# Patient Record
Sex: MALE | URBAN - METROPOLITAN AREA
[De-identification: names, ages, dates, MRNs, and addresses within clinical notes are randomized per-mention and may not be internally consistent; named-entity substitution may affect disease eponyms.]

---

## 2021-07-28 ENCOUNTER — APPOINTMENT (RX ONLY)
Dept: URBAN - METROPOLITAN AREA CLINIC 308 | Facility: CLINIC | Age: 66
Setting detail: DERMATOLOGY
End: 2021-07-28

## 2021-07-28 DIAGNOSIS — D18.0 HEMANGIOMA: ICD-10-CM

## 2021-07-28 DIAGNOSIS — L57.0 ACTINIC KERATOSIS: ICD-10-CM

## 2021-07-28 DIAGNOSIS — D485 NEOPLASM OF UNCERTAIN BEHAVIOR OF SKIN: ICD-10-CM

## 2021-07-28 DIAGNOSIS — L20.89 OTHER ATOPIC DERMATITIS: ICD-10-CM | Status: INADEQUATELY CONTROLLED

## 2021-07-28 DIAGNOSIS — L82.1 OTHER SEBORRHEIC KERATOSIS: ICD-10-CM

## 2021-07-28 DIAGNOSIS — L81.4 OTHER MELANIN HYPERPIGMENTATION: ICD-10-CM

## 2021-07-28 DIAGNOSIS — D22 MELANOCYTIC NEVI: ICD-10-CM

## 2021-07-28 PROBLEM — D18.01 HEMANGIOMA OF SKIN AND SUBCUTANEOUS TISSUE: Status: ACTIVE | Noted: 2021-07-28

## 2021-07-28 PROBLEM — L20.84 INTRINSIC (ALLERGIC) ECZEMA: Status: ACTIVE | Noted: 2021-07-28

## 2021-07-28 PROBLEM — D48.5 NEOPLASM OF UNCERTAIN BEHAVIOR OF SKIN: Status: ACTIVE | Noted: 2021-07-28

## 2021-07-28 PROBLEM — D22.5 MELANOCYTIC NEVI OF TRUNK: Status: ACTIVE | Noted: 2021-07-28

## 2021-07-28 PROCEDURE — ? BIOPSY BY SHAVE METHOD

## 2021-07-28 PROCEDURE — ? TREATMENT REGIMEN

## 2021-07-28 PROCEDURE — ? FULL BODY SKIN EXAM

## 2021-07-28 PROCEDURE — ? PRESCRIPTION MEDICATION MANAGEMENT

## 2021-07-28 PROCEDURE — ? LIQUID NITROGEN

## 2021-07-28 PROCEDURE — 11102 TANGNTL BX SKIN SINGLE LES: CPT

## 2021-07-28 PROCEDURE — ? COUNSELING

## 2021-07-28 PROCEDURE — ? ORDER FOR PATCH TESTING

## 2021-07-28 PROCEDURE — ? ADDITIONAL NOTES

## 2021-07-28 PROCEDURE — 17000 DESTRUCT PREMALG LESION: CPT | Mod: 59

## 2021-07-28 PROCEDURE — 99214 OFFICE O/P EST MOD 30 MIN: CPT | Mod: 25

## 2021-07-28 ASSESSMENT — LOCATION ZONE DERM
LOCATION ZONE: ARM
LOCATION ZONE: TRUNK
LOCATION ZONE: HAND
LOCATION ZONE: FACE

## 2021-07-28 ASSESSMENT — LOCATION DETAILED DESCRIPTION DERM
LOCATION DETAILED: RIGHT RADIAL DORSAL HAND
LOCATION DETAILED: RIGHT SUPERIOR MEDIAL UPPER BACK
LOCATION DETAILED: RIGHT MID-UPPER BACK
LOCATION DETAILED: RIGHT MEDIAL TEMPLE
LOCATION DETAILED: LEFT MID-UPPER BACK
LOCATION DETAILED: RIGHT VENTRAL PROXIMAL FOREARM
LOCATION DETAILED: EPIGASTRIC SKIN

## 2021-07-28 ASSESSMENT — LOCATION SIMPLE DESCRIPTION DERM
LOCATION SIMPLE: ABDOMEN
LOCATION SIMPLE: LEFT UPPER BACK
LOCATION SIMPLE: RIGHT UPPER BACK
LOCATION SIMPLE: RIGHT FOREARM
LOCATION SIMPLE: RIGHT TEMPLE
LOCATION SIMPLE: RIGHT HAND

## 2021-07-28 NOTE — PROCEDURE: BIOPSY BY SHAVE METHOD

## 2021-07-28 NOTE — PROCEDURE: ORDER FOR PATCH TESTING
Patch Test To Be Applied: [employer] 80 Series
Location Patches Should Be Applied: Back
Patch Test Reading Schedule: First Reading at 48 hours and Second Reading at 96 hours
Counseling: I discussed the timing of the procedure and ensured the patient understands that this test requires multiple visits. No topical steroids applied should be applied to the patch testing location and no oral prednisone for two week prior to the test. While the patches are in place they should be kept dry which will limit bathing, swimming an exercise. I also explained that it is common for testing to be negative and this doesn't mean there isn't a allergic reaction occurring. During the testing itching is common.
Detail Level: Simple

## 2021-07-28 NOTE — PROCEDURE: COUNSELING
Detail Level: Simple
Detail Level: Zone
Cleanser Recommendations: Cetaphil gentle cleanser is great for the face, vanicream makes great hypoallergenic products, and dove white fragrance free beauty bar is great for the body.
Bleach Bath Recommendations: 1/4 cup of bleach in entire bathtub of water- soak 15 min, then rinse with warm water and repeat 2x a week
Detail Level: Detailed
Moisturizer Recommendations: Cerave or Cetaphil cream

## 2021-07-28 NOTE — PROCEDURE: LIQUID NITROGEN
Render Note In Bullet Format When Appropriate: No
Detail Level: Detailed
Consent: The patient's consent was obtained including but not limited to risks of crusting, scabbing, blistering, scarring, darker or lighter pigmentary change, recurrence, incomplete removal and infection.
Show Aperture Variable?: Yes
Post-Care Instructions: I reviewed with the patient in detail post-care instructions. Patient is to wear sunprotection, and avoid picking at any of the treated lesions. Pt may apply Vaseline to crusted or scabbing areas.
Duration Of Freeze Thaw-Cycle (Seconds): 0

## 2021-09-07 ENCOUNTER — APPOINTMENT (RX ONLY)
Dept: URBAN - METROPOLITAN AREA CLINIC 308 | Facility: CLINIC | Age: 66
Setting detail: DERMATOLOGY
End: 2021-09-07

## 2021-09-07 DIAGNOSIS — L23.9 ALLERGIC CONTACT DERMATITIS, UNSPECIFIED CAUSE: ICD-10-CM

## 2021-09-07 PROCEDURE — ? PATCH TESTING

## 2021-09-07 PROCEDURE — ? DIAGNOSIS COMMENT

## 2021-09-07 PROCEDURE — ? COUNSELING

## 2021-09-07 PROCEDURE — 95044 PATCH/APPLICATION TESTS: CPT

## 2021-09-07 PROCEDURE — ? PATIENT SPECIFIC COUNSELING

## 2021-09-07 ASSESSMENT — LOCATION SIMPLE DESCRIPTION DERM
LOCATION SIMPLE: LEFT UPPER BACK
LOCATION SIMPLE: RIGHT UPPER BACK

## 2021-09-07 ASSESSMENT — LOCATION DETAILED DESCRIPTION DERM
LOCATION DETAILED: LEFT SUPERIOR LATERAL UPPER BACK
LOCATION DETAILED: LEFT SUPERIOR UPPER BACK
LOCATION DETAILED: RIGHT SUPERIOR UPPER BACK

## 2021-09-07 ASSESSMENT — LOCATION ZONE DERM: LOCATION ZONE: TRUNK

## 2021-09-07 NOTE — PROCEDURE: PATIENT SPECIFIC COUNSELING
You will need to return to the office for two additional visits to be able to read the results appropriately: \\n- 48 hours after patches applied (1st read of results)\\n- 4-7 days after patches applied (2nd read of results)\\n\\nPlease be aware of the following for best results:\\n- You CANNOT get your back wet until you have completed the second read of the results. \\n- Avoid activities where you are twisting, which may loosen the patches.\\n- Avoid activities that induce sweating, as the sweat may displace the substances on the patches, rendering results misleading/indeterminate. \\n- If the tape begins to come off, please reinforce with micropore tape (this can be found at most pharmacies)
Detail Level: Detailed

## 2021-09-07 NOTE — PROCEDURE: PATCH TESTING
Post-Care Instructions: I reviewed with the patient in detail post-care instructions. Patient should not sweat, pick at, or get the patches wet for 48 hours.
Consent: Written consent obtained, risks reviewed including but not limited to rash, itching, allergic reaction, systemic rash, remote possiblity of anaphylaxis to allergen.
Number Of Patches (Maximum Allowable Per Dos By Cms Is 90): 80
Detail Level: Detailed

## 2021-09-09 ENCOUNTER — APPOINTMENT (RX ONLY)
Dept: URBAN - METROPOLITAN AREA CLINIC 373 | Facility: CLINIC | Age: 66
Setting detail: DERMATOLOGY
End: 2021-09-09

## 2021-09-09 DIAGNOSIS — L259 CONTACT DERMATITIS AND OTHER ECZEMA, UNSPECIFIED CAUSE: ICD-10-CM

## 2021-09-09 PROBLEM — L23.9 ALLERGIC CONTACT DERMATITIS, UNSPECIFIED CAUSE: Status: ACTIVE | Noted: 2021-09-09

## 2021-09-09 PROCEDURE — 99212 OFFICE O/P EST SF 10 MIN: CPT

## 2021-09-09 PROCEDURE — ? NORTH AMERICAN 80 PATCH TEST READING

## 2021-09-09 NOTE — PROCEDURE: [employer] 80 PATCH TEST READING
Name Of Allergen 24: benzocaine
Allergen 30 Reaction: no reaction
Name Of Allergen 3: fragrance mix
Name Of Allergen 15: black rubber mix - PPD
Name Of Allergen 57: ethyleneurea, melamine formaldehyde mix
Name Of Allergen 18: tixocortol-21-pivalate
Name Of Allergen 21: bisphenol A epoxy resin
Name Of Allergen 66: phenoxyethanol
Name Of Allergen 12: thiuram mix
Name Of Allergen 60: cetylstearylalcohol
Name Of Allergen 69: butylhydroxytoluene (BHT)
Name Of Allergen 27: cinchocaine-HCl
Name Of Allergen 38: 2-hydroxyethyl methacrylate (HEMA)
Name Of Allergen 63: clobetasol-17-propionate
Name Of Allergen 79: amidoamine (stearamidopropyl dimethylamine)
Name Of Allergen 41: cinnamic aldehyde
Name Of Allergen 44: 4-chloro-3,5-xylenol (PCMX)
Name Of Allergen 29: 2-bromo-2-nitropropane-1,3-diol (bronopol™)
Name Of Allergen 73: methylchloroisothiazolinone/methylisothiazolinone
Name Of Allergen 32: methyldibromo glutaronitrile (MDBGN)
Name Of Allergen 56: compositae mix
Name Of Allergen 47: tosylamide formaldehyde resin
Name Of Allergen 76: dimethylaminopropylamine (DMAPA)
What Reading Time Point?: 24 hour
Name Of Allergen 22: ethylenediamine dihydrochloride
Name Of Allergen 33: disperse blue mix
Name Of Allergen 67: disperse orange-3
Name Of Allergen 48: sesquiterpenelactone mix
Name Of Allergen 70: 2-ethylhexyl-4-methoxycinnamate (octinoxate)
Name Of Allergen 61: glutaraldehyde
Name Of Allergen 39: decyl glucoside
Name Of Allergen 80: chlorhexidine digluconate
Name Of Allergen 71: benzyl alcohol
Number Of Patches Read: 80
Name Of Allergen 54: sorbic acid
Name Of Allergen 42: ethyl acrylate
Name Of Allergen 30: lidocaine-HCl
Name Of Allergen 74: methylisothiazolinone
Name Of Allergen 45: propolis
Allergen 2 Reaction: 1+
Name Of Allergen 36: iodopropynyl butylcarbamate
Name Of Allergen 6: budesonide
Name Of Allergen 77: oleamidopropyl dimethylamine
Name Of Allergen 51: benzalkonium chloride
Name Of Allergen 9: 4-phenylenediamine base
Name Of Allergen 62: triamcinolone acetonide
Name Of Allergen 72: formaldehyde
Name Of Allergen 28: partcelenaolide
Show Allergen Counseling In The Note?: Yes
Name Of Allergen 75: cocamidopropyl betaine
Name Of Allergen 55: cananga odorata (niru marrufo)
Name Of Allergen 46: 2-hydroxy-4-methoxybenzophenone (oxybenzone)
Name Of Allergen 43: tea tree oil, oxidized
Name Of Allergen 68: jasminum officinale oil (jasminum grandiflorum)
Name Of Allergen 49: coconut diethanolamide (cocamide LOUIS)
Name Of Allergen 16: imidazolidinyl urea (Germall® 115)
Name Of Allergen 78: propylene glycol
Name Of Allergen 52: benzophenone-4
Detail Level: Zone
Name Of Allergen 40: methyl methacrylate
Name Of Allergen 10: potassium dichromate
Name Of Allergen 13: diazolidinyl urea (Germall® II)
Name Of Allergen 1: nickel sulfate hexahydrate
Name Of Allergen 25: bacitracin
Name Of Allergen 4: quaternium 15
Name Of Allergen 7: cobalt (II) chloride hexahydrate
Name Of Allergen 58: sorbitan sesquioleate
Name Of Allergen 19: 2-mercaptobenzothiazole
Name Of Allergen 64: dl alpha tocopherol
Name Of Allergen 35: fragrance mix II
Name Of Allergen 17: mercapto mix
Name Of Allergen 50: 4-chloro-3-cresol ([facility])
Name Of Allergen 8: c-[id number] formaldehyde resin
Name Of Allergen 53: triclosan
Name Of Allergen 20: colophony
Name Of Allergen 11: carba mix
Name Of Allergen 23: amerchol L101
Name Of Allergen 14: paraben mix
Name Of Allergen 2: balsam of peru (myroxylon pereirae resin)
Name Of Allergen 5: neomycin sulfate
Name Of Allergen 26: DMDM hydantoin
Name Of Allergen 59: 1,3-diphenylguanidine (DPG)
Name Of Allergen 65: ethyl cyanoacrylate
Name Of Allergen 31: gold sodium thiosulfate
Name Of Allergen 34: hydrocortisone-17-butyrate
Name Of Allergen 37: mixed dialkyl thioureas

## 2021-09-13 ENCOUNTER — APPOINTMENT (RX ONLY)
Dept: URBAN - METROPOLITAN AREA CLINIC 308 | Facility: CLINIC | Age: 66
Setting detail: DERMATOLOGY
End: 2021-09-13

## 2021-09-13 DIAGNOSIS — L259 CONTACT DERMATITIS AND OTHER ECZEMA, UNSPECIFIED CAUSE: ICD-10-CM

## 2021-09-13 DIAGNOSIS — L20.89 OTHER ATOPIC DERMATITIS: ICD-10-CM

## 2021-09-13 PROBLEM — L23.9 ALLERGIC CONTACT DERMATITIS, UNSPECIFIED CAUSE: Status: ACTIVE | Noted: 2021-09-13

## 2021-09-13 PROBLEM — L20.84 INTRINSIC (ALLERGIC) ECZEMA: Status: ACTIVE | Noted: 2021-09-13

## 2021-09-13 PROCEDURE — ? NORTH AMERICAN 80 PATCH TEST READING

## 2021-09-13 PROCEDURE — ? COUNSELING

## 2021-09-13 PROCEDURE — 99213 OFFICE O/P EST LOW 20 MIN: CPT

## 2021-09-13 NOTE — PROCEDURE: [employer] 80 PATCH TEST READING
Name Of Allergen 24: benzocaine
Allergen 30 Reaction: no reaction
Name Of Allergen 3: fragrance mix
Name Of Allergen 15: black rubber mix - PPD
Name Of Allergen 57: ethyleneurea, melamine formaldehyde mix
Name Of Allergen 18: tixocortol-21-pivalate
Name Of Allergen 21: bisphenol A epoxy resin
Name Of Allergen 66: phenoxyethanol
Name Of Allergen 12: thiuram mix
Name Of Allergen 60: cetylstearylalcohol
Name Of Allergen 69: butylhydroxytoluene (BHT)
Name Of Allergen 27: cinchocaine-HCl
Name Of Allergen 38: 2-hydroxyethyl methacrylate (HEMA)
Name Of Allergen 63: clobetasol-17-propionate
Name Of Allergen 79: amidoamine (stearamidopropyl dimethylamine)
Name Of Allergen 41: cinnamic aldehyde
Name Of Allergen 44: 4-chloro-3,5-xylenol (PCMX)
Name Of Allergen 29: 2-bromo-2-nitropropane-1,3-diol (bronopol™)
Name Of Allergen 73: methylchloroisothiazolinone/methylisothiazolinone
Name Of Allergen 32: methyldibromo glutaronitrile (MDBGN)
Name Of Allergen 56: compositae mix
Name Of Allergen 47: tosylamide formaldehyde resin
Name Of Allergen 76: dimethylaminopropylamine (DMAPA)
What Reading Time Point?: 24 hour
Name Of Allergen 22: ethylenediamine dihydrochloride
Name Of Allergen 33: disperse blue mix
Name Of Allergen 67: disperse orange-3
Name Of Allergen 48: sesquiterpenelactone mix
Name Of Allergen 70: 2-ethylhexyl-4-methoxycinnamate (octinoxate)
Name Of Allergen 61: glutaraldehyde
Name Of Allergen 39: decyl glucoside
Name Of Allergen 80: chlorhexidine digluconate
Name Of Allergen 71: benzyl alcohol
Number Of Patches Read: 80
Name Of Allergen 54: sorbic acid
Name Of Allergen 42: ethyl acrylate
Name Of Allergen 30: lidocaine-HCl
Name Of Allergen 74: methylisothiazolinone
Name Of Allergen 45: propolis
Name Of Allergen 36: iodopropynyl butylcarbamate
Name Of Allergen 6: budesonide
Name Of Allergen 77: oleamidopropyl dimethylamine
Name Of Allergen 51: benzalkonium chloride
Name Of Allergen 9: 4-phenylenediamine base
Name Of Allergen 62: triamcinolone acetonide
Name Of Allergen 72: formaldehyde
Name Of Allergen 28: partcelenaolide
Show Allergen Counseling In The Note?: Yes
Name Of Allergen 75: cocamidopropyl betaine
Name Of Allergen 55: cananga odorata (niru marrufo)
Name Of Allergen 46: 2-hydroxy-4-methoxybenzophenone (oxybenzone)
Name Of Allergen 43: tea tree oil, oxidized
Name Of Allergen 68: jasminum officinale oil (jasminum grandiflorum)
Name Of Allergen 49: coconut diethanolamide (cocamide LOUIS)
Name Of Allergen 16: imidazolidinyl urea (Germall® 115)
Name Of Allergen 78: propylene glycol
Name Of Allergen 52: benzophenone-4
Detail Level: Zone
Name Of Allergen 40: methyl methacrylate
Name Of Allergen 10: potassium dichromate
Name Of Allergen 13: diazolidinyl urea (Germall® II)
Name Of Allergen 1: nickel sulfate hexahydrate
Name Of Allergen 25: bacitracin
Name Of Allergen 4: quaternium 15
Name Of Allergen 7: cobalt (II) chloride hexahydrate
Name Of Allergen 58: sorbitan sesquioleate
Name Of Allergen 19: 2-mercaptobenzothiazole
Name Of Allergen 64: dl alpha tocopherol
Name Of Allergen 35: fragrance mix II
Name Of Allergen 17: mercapto mix
Name Of Allergen 50: 4-chloro-3-cresol (PCMC)
Name Of Allergen 8: c-brhg-denpipiaiqx formaldehyde resin
Name Of Allergen 53: triclosan
Name Of Allergen 20: colophony
Name Of Allergen 11: carba mix
Name Of Allergen 23: amerchol L101
Name Of Allergen 14: paraben mix
Name Of Allergen 2: balsam of peru (myroxylon pereirae resin)
Name Of Allergen 5: neomycin sulfate
Name Of Allergen 26: DMDM hydantoin
Name Of Allergen 59: 1,3-diphenylguanidine (DPG)
Name Of Allergen 65: ethyl cyanoacrylate
Name Of Allergen 31: gold sodium thiosulfate
Name Of Allergen 34: hydrocortisone-17-butyrate
Name Of Allergen 37: mixed dialkyl thioureas

## 2021-09-13 NOTE — PROCEDURE: COUNSELING
Detail Level: Detailed
Bleach Bath Recommendations: 1/4 cup of bleach in entire bathtub of water- soak 15 min, then rinse with warm water and repeat 2x a week
Cleanser Recommendations: Cetaphil gentle cleanser is great for the face, vanicream makes great hypoallergenic products, and dove white fragrance free beauty bar is great for the body.
Moisturizer Recommendations: Cerave or Cetaphil cream

## 2022-09-14 ENCOUNTER — APPOINTMENT (RX ONLY)
Dept: URBAN - METROPOLITAN AREA CLINIC 308 | Facility: CLINIC | Age: 67
Setting detail: DERMATOLOGY
End: 2022-09-14

## 2022-09-14 DIAGNOSIS — L81.4 OTHER MELANIN HYPERPIGMENTATION: ICD-10-CM

## 2022-09-14 DIAGNOSIS — L20.89 OTHER ATOPIC DERMATITIS: ICD-10-CM | Status: INADEQUATELY CONTROLLED

## 2022-09-14 DIAGNOSIS — L57.0 ACTINIC KERATOSIS: ICD-10-CM

## 2022-09-14 DIAGNOSIS — D22 MELANOCYTIC NEVI: ICD-10-CM

## 2022-09-14 DIAGNOSIS — D18.0 HEMANGIOMA: ICD-10-CM

## 2022-09-14 DIAGNOSIS — L82.1 OTHER SEBORRHEIC KERATOSIS: ICD-10-CM

## 2022-09-14 PROBLEM — D22.5 MELANOCYTIC NEVI OF TRUNK: Status: ACTIVE | Noted: 2022-09-14

## 2022-09-14 PROBLEM — L20.84 INTRINSIC (ALLERGIC) ECZEMA: Status: ACTIVE | Noted: 2022-09-14

## 2022-09-14 PROBLEM — D18.01 HEMANGIOMA OF SKIN AND SUBCUTANEOUS TISSUE: Status: ACTIVE | Noted: 2022-09-14

## 2022-09-14 PROCEDURE — ? FULL BODY SKIN EXAM

## 2022-09-14 PROCEDURE — ? TREATMENT REGIMEN

## 2022-09-14 PROCEDURE — ? LIQUID NITROGEN

## 2022-09-14 PROCEDURE — 17003 DESTRUCT PREMALG LES 2-14: CPT

## 2022-09-14 PROCEDURE — 17000 DESTRUCT PREMALG LESION: CPT

## 2022-09-14 PROCEDURE — ? PRESCRIPTION MEDICATION MANAGEMENT

## 2022-09-14 PROCEDURE — 99214 OFFICE O/P EST MOD 30 MIN: CPT | Mod: 25

## 2022-09-14 PROCEDURE — ? PRESCRIPTION

## 2022-09-14 PROCEDURE — ? ADDITIONAL NOTES

## 2022-09-14 PROCEDURE — ? COUNSELING

## 2022-09-14 RX ORDER — CLOBETASOL PROPIONATE 0.5 MG/G
OINTMENT TOPICAL BID
Qty: 60 | Refills: 6 | Status: ERX | COMMUNITY
Start: 2022-09-14

## 2022-09-14 RX ADMIN — CLOBETASOL PROPIONATE: 0.5 OINTMENT TOPICAL at 00:00

## 2022-09-14 ASSESSMENT — LOCATION DETAILED DESCRIPTION DERM
LOCATION DETAILED: RIGHT MID-UPPER BACK
LOCATION DETAILED: NASAL DORSUM
LOCATION DETAILED: LEFT INFERIOR CENTRAL MALAR CHEEK
LOCATION DETAILED: LEFT MEDIAL MALAR CHEEK
LOCATION DETAILED: RIGHT LATERAL TEMPLE
LOCATION DETAILED: RIGHT CENTRAL MALAR CHEEK
LOCATION DETAILED: LEFT CRUS OF HELIX
LOCATION DETAILED: EPIGASTRIC SKIN
LOCATION DETAILED: RIGHT ANTERIOR SHOULDER
LOCATION DETAILED: RIGHT MEDIAL TEMPLE
LOCATION DETAILED: RIGHT VENTRAL PROXIMAL FOREARM
LOCATION DETAILED: LEFT MEDIAL ZYGOMA
LOCATION DETAILED: LEFT MID-UPPER BACK

## 2022-09-14 ASSESSMENT — LOCATION ZONE DERM
LOCATION ZONE: NOSE
LOCATION ZONE: ARM
LOCATION ZONE: TRUNK
LOCATION ZONE: FACE
LOCATION ZONE: EAR

## 2022-09-14 ASSESSMENT — LOCATION SIMPLE DESCRIPTION DERM
LOCATION SIMPLE: LEFT CHEEK
LOCATION SIMPLE: RIGHT SHOULDER
LOCATION SIMPLE: NOSE
LOCATION SIMPLE: LEFT UPPER BACK
LOCATION SIMPLE: RIGHT FOREARM
LOCATION SIMPLE: RIGHT TEMPLE
LOCATION SIMPLE: LEFT EAR
LOCATION SIMPLE: LEFT ZYGOMA
LOCATION SIMPLE: RIGHT CHEEK
LOCATION SIMPLE: ABDOMEN
LOCATION SIMPLE: RIGHT UPPER BACK

## 2022-09-14 NOTE — PROCEDURE: PRESCRIPTION MEDICATION MANAGEMENT
Discontinue Regimen: Fluocinonide ointment
Render In Strict Bullet Format?: No
Detail Level: Zone
Initiate Treatment: Clobetasol ointment BID Monday-Friday

## 2022-09-14 NOTE — PROCEDURE: COUNSELING
Detail Level: Detailed
Moisturizer Recommendations: Cerave or Cetaphil cream
Bleach Bath Recommendations: 1/4 cup of bleach in entire bathtub of water- soak 15 min, then rinse with warm water and repeat 2x a week
Cleanser Recommendations: Cetaphil gentle cleanser is great for the face, vanicream makes great hypoallergenic products, and dove white fragrance free beauty bar is great for the body.
Detail Level: Simple
Detail Level: Zone

## 2022-09-14 NOTE — PROCEDURE: LIQUID NITROGEN
Consent: The patient's consent was obtained including but not limited to risks of crusting, scabbing, blistering, scarring, darker or lighter pigmentary change, recurrence, incomplete removal and infection.
Render Post-Care Instructions In Note?: no
Detail Level: Detailed
Show Aperture Variable?: Yes
Duration Of Freeze Thaw-Cycle (Seconds): 8
Post-Care Instructions: I reviewed with the patient in detail post-care instructions. Patient is to wear sunprotection, and avoid picking at any of the treated lesions. Pt may apply Vaseline to crusted or scabbing areas.

## 2022-09-14 NOTE — PROCEDURE: MIPS QUALITY
Quality 431: Preventive Care And Screening: Unhealthy Alcohol Use - Screening: Patient not identified as an unhealthy alcohol user when screened for unhealthy alcohol use using a systematic screening method
Quality 130: Documentation Of Current Medications In The Medical Record: Current Medications Documented
Quality 47: Advance Care Plan: Advance Care Planning discussed and documented in the medical record; patient did not wish or was not able to name a surrogate decision maker or provide an advance care plan.
Detail Level: Detailed
Quality 226: Preventive Care And Screening: Tobacco Use: Screening And Cessation Intervention: Patient screened for tobacco use and is an ex/non-smoker
Quality 358: Patient-Centered Surgical Risk Assessment And Communication: A patient-specific risk assessment with a risk calculator was not completed or communicated to patient and/or family.

## 2023-09-21 ENCOUNTER — APPOINTMENT (RX ONLY)
Dept: URBAN - METROPOLITAN AREA CLINIC 373 | Facility: CLINIC | Age: 68
Setting detail: DERMATOLOGY
End: 2023-09-21

## 2023-09-21 DIAGNOSIS — L81.4 OTHER MELANIN HYPERPIGMENTATION: ICD-10-CM

## 2023-09-21 DIAGNOSIS — L57.0 ACTINIC KERATOSIS: ICD-10-CM

## 2023-09-21 DIAGNOSIS — D22 MELANOCYTIC NEVI: ICD-10-CM

## 2023-09-21 DIAGNOSIS — D18.0 HEMANGIOMA: ICD-10-CM

## 2023-09-21 DIAGNOSIS — L82.1 OTHER SEBORRHEIC KERATOSIS: ICD-10-CM

## 2023-09-21 PROBLEM — D18.01 HEMANGIOMA OF SKIN AND SUBCUTANEOUS TISSUE: Status: ACTIVE | Noted: 2023-09-21

## 2023-09-21 PROBLEM — D22.5 MELANOCYTIC NEVI OF TRUNK: Status: ACTIVE | Noted: 2023-09-21

## 2023-09-21 PROCEDURE — ? LIQUID NITROGEN

## 2023-09-21 PROCEDURE — ? ADDITIONAL NOTES

## 2023-09-21 PROCEDURE — ? FULL BODY SKIN EXAM

## 2023-09-21 PROCEDURE — 17003 DESTRUCT PREMALG LES 2-14: CPT

## 2023-09-21 PROCEDURE — 17000 DESTRUCT PREMALG LESION: CPT

## 2023-09-21 PROCEDURE — ? COUNSELING

## 2023-09-21 PROCEDURE — ? TREATMENT REGIMEN

## 2023-09-21 PROCEDURE — 99213 OFFICE O/P EST LOW 20 MIN: CPT | Mod: 25

## 2023-09-21 ASSESSMENT — LOCATION DETAILED DESCRIPTION DERM
LOCATION DETAILED: RIGHT VENTRAL PROXIMAL FOREARM
LOCATION DETAILED: RIGHT CENTRAL BUCCAL CHEEK
LOCATION DETAILED: RIGHT INFERIOR CENTRAL MALAR CHEEK
LOCATION DETAILED: LEFT CENTRAL ZYGOMA
LOCATION DETAILED: RIGHT MID-UPPER BACK
LOCATION DETAILED: EPIGASTRIC SKIN
LOCATION DETAILED: LEFT DISTAL RADIAL DORSAL FOREARM
LOCATION DETAILED: LEFT MEDIAL TEMPLE
LOCATION DETAILED: RIGHT NASAL SIDEWALL
LOCATION DETAILED: LEFT MID-UPPER BACK
LOCATION DETAILED: LEFT SUPERIOR LATERAL MALAR CHEEK
LOCATION DETAILED: RIGHT SUPERIOR FOREHEAD
LOCATION DETAILED: RIGHT INFERIOR TEMPLE
LOCATION DETAILED: RIGHT MEDIAL SUPERIOR CHEST
LOCATION DETAILED: RIGHT SUPERIOR LATERAL FOREHEAD
LOCATION DETAILED: RIGHT SUPERIOR HELIX

## 2023-09-21 ASSESSMENT — LOCATION ZONE DERM
LOCATION ZONE: NOSE
LOCATION ZONE: ARM
LOCATION ZONE: FACE
LOCATION ZONE: TRUNK
LOCATION ZONE: EAR

## 2023-09-21 ASSESSMENT — LOCATION SIMPLE DESCRIPTION DERM
LOCATION SIMPLE: LEFT ZYGOMA
LOCATION SIMPLE: RIGHT FOREHEAD
LOCATION SIMPLE: RIGHT EAR
LOCATION SIMPLE: RIGHT TEMPLE
LOCATION SIMPLE: LEFT CHEEK
LOCATION SIMPLE: RIGHT CHEEK
LOCATION SIMPLE: RIGHT FOREARM
LOCATION SIMPLE: LEFT FOREARM
LOCATION SIMPLE: RIGHT UPPER BACK
LOCATION SIMPLE: RIGHT NOSE
LOCATION SIMPLE: LEFT TEMPLE
LOCATION SIMPLE: CHEST
LOCATION SIMPLE: ABDOMEN
LOCATION SIMPLE: LEFT UPPER BACK

## 2023-09-21 NOTE — PROCEDURE: LIQUID NITROGEN
Post-Care Instructions: I reviewed with the patient in detail post-care instructions. Patient is to wear sunprotection, and avoid picking at any of the treated lesions. Pt may apply Vaseline to crusted or scabbing areas.
Duration Of Freeze Thaw-Cycle (Seconds): 8
Detail Level: Detailed
Show Applicator Variable?: Yes
Consent: The patient's consent was obtained including but not limited to risks of crusting, scabbing, blistering, scarring, darker or lighter pigmentary change, recurrence, incomplete removal and infection.
Render Post-Care Instructions In Note?: no

## 2023-09-21 NOTE — PROCEDURE: MIPS QUALITY
Quality 358: Patient-Centered Surgical Risk Assessment And Communication: A patient-specific risk assessment with a risk calculator was not completed or communicated to patient and/or family.
Detail Level: Detailed
Quality 431: Preventive Care And Screening: Unhealthy Alcohol Use - Screening: Patient not identified as an unhealthy alcohol user when screened for unhealthy alcohol use using a systematic screening method
Quality 47: Advance Care Plan: Advance Care Planning discussed and documented in the medical record; patient did not wish or was not able to name a surrogate decision maker or provide an advance care plan.
Quality 226: Preventive Care And Screening: Tobacco Use: Screening And Cessation Intervention: Patient screened for tobacco use and is an ex/non-smoker
Quality 130: Documentation Of Current Medications In The Medical Record: Current Medications Documented

## 2024-12-09 ENCOUNTER — APPOINTMENT (OUTPATIENT)
Dept: URBAN - METROPOLITAN AREA CLINIC 373 | Facility: CLINIC | Age: 69
Setting detail: DERMATOLOGY
End: 2024-12-09

## 2024-12-09 DIAGNOSIS — L72.8 OTHER FOLLICULAR CYSTS OF THE SKIN AND SUBCUTANEOUS TISSUE: ICD-10-CM | Status: STABLE

## 2024-12-09 DIAGNOSIS — L82.0 INFLAMED SEBORRHEIC KERATOSIS: ICD-10-CM | Status: INADEQUATELY CONTROLLED

## 2024-12-09 DIAGNOSIS — L57.0 ACTINIC KERATOSIS: ICD-10-CM | Status: WORSENING

## 2024-12-09 DIAGNOSIS — D22 MELANOCYTIC NEVI: ICD-10-CM | Status: STABLE

## 2024-12-09 DIAGNOSIS — D18.0 HEMANGIOMA: ICD-10-CM | Status: STABLE

## 2024-12-09 DIAGNOSIS — D485 NEOPLASM OF UNCERTAIN BEHAVIOR OF SKIN: ICD-10-CM | Status: INADEQUATELY CONTROLLED

## 2024-12-09 DIAGNOSIS — L82.1 OTHER SEBORRHEIC KERATOSIS: ICD-10-CM | Status: STABLE

## 2024-12-09 DIAGNOSIS — L81.4 OTHER MELANIN HYPERPIGMENTATION: ICD-10-CM | Status: STABLE

## 2024-12-09 PROBLEM — D48.5 NEOPLASM OF UNCERTAIN BEHAVIOR OF SKIN: Status: ACTIVE | Noted: 2024-12-09

## 2024-12-09 PROBLEM — D22.5 MELANOCYTIC NEVI OF TRUNK: Status: ACTIVE | Noted: 2024-12-09

## 2024-12-09 PROBLEM — D18.01 HEMANGIOMA OF SKIN AND SUBCUTANEOUS TISSUE: Status: ACTIVE | Noted: 2024-12-09

## 2024-12-09 PROCEDURE — 99213 OFFICE O/P EST LOW 20 MIN: CPT | Mod: 25

## 2024-12-09 PROCEDURE — 17110 DESTRUCTION B9 LES UP TO 14: CPT

## 2024-12-09 PROCEDURE — ? FULL BODY SKIN EXAM

## 2024-12-09 PROCEDURE — 11102 TANGNTL BX SKIN SINGLE LES: CPT | Mod: 59

## 2024-12-09 PROCEDURE — ? COUNSELING

## 2024-12-09 PROCEDURE — ? BIOPSY BY SHAVE METHOD

## 2024-12-09 PROCEDURE — 17000 DESTRUCT PREMALG LESION: CPT | Mod: 59

## 2024-12-09 PROCEDURE — ? LIQUID NITROGEN

## 2024-12-09 PROCEDURE — 17003 DESTRUCT PREMALG LES 2-14: CPT | Mod: 59

## 2024-12-09 PROCEDURE — ? TREATMENT REGIMEN

## 2024-12-09 ASSESSMENT — LOCATION SIMPLE DESCRIPTION DERM
LOCATION SIMPLE: SUPERIOR FOREHEAD
LOCATION SIMPLE: LEFT CHEEK
LOCATION SIMPLE: LEFT UPPER BACK
LOCATION SIMPLE: LEFT EAR
LOCATION SIMPLE: RIGHT CHEEK
LOCATION SIMPLE: RIGHT FOREARM
LOCATION SIMPLE: ABDOMEN
LOCATION SIMPLE: RIGHT UPPER BACK
LOCATION SIMPLE: CHEST
LOCATION SIMPLE: RIGHT FOREHEAD

## 2024-12-09 ASSESSMENT — LOCATION ZONE DERM
LOCATION ZONE: FACE
LOCATION ZONE: ARM
LOCATION ZONE: EAR
LOCATION ZONE: TRUNK

## 2024-12-09 ASSESSMENT — LOCATION DETAILED DESCRIPTION DERM
LOCATION DETAILED: RIGHT VENTRAL PROXIMAL FOREARM
LOCATION DETAILED: RIGHT INFERIOR CENTRAL MALAR CHEEK
LOCATION DETAILED: RIGHT LATERAL MALAR CHEEK
LOCATION DETAILED: LEFT SUPERIOR HELIX
LOCATION DETAILED: RIGHT SUPERIOR LATERAL FOREHEAD
LOCATION DETAILED: RIGHT FOREHEAD
LOCATION DETAILED: LEFT SUPERIOR CENTRAL MALAR CHEEK
LOCATION DETAILED: LEFT CENTRAL MALAR CHEEK
LOCATION DETAILED: SUPERIOR MID FOREHEAD
LOCATION DETAILED: RIGHT MEDIAL SUPERIOR CHEST
LOCATION DETAILED: RIGHT CENTRAL MALAR CHEEK
LOCATION DETAILED: RIGHT SUPERIOR FOREHEAD
LOCATION DETAILED: LEFT MID-UPPER BACK
LOCATION DETAILED: EPIGASTRIC SKIN
LOCATION DETAILED: RIGHT MID-UPPER BACK

## 2024-12-09 NOTE — HPI: EVALUATION OF SKIN LESION(S)
What Type Of Note Output Would You Prefer (Optional)?: Standard Output
Hpi Title: Evaluation of Skin Lesions
Additional History: Pt presents with lesions on face

## 2024-12-09 NOTE — PROCEDURE: LIQUID NITROGEN
Number Of Freeze-Thaw Cycles: 1 freeze-thaw cycle
Render Post-Care Instructions In Note?: no
Post-Care Instructions: I reviewed with the patient in detail post-care instructions. Patient is to wear sunprotection, and avoid picking at any of the treated lesions. Pt may apply Vaseline to crusted or scabbing areas.
Show Aperture Variable?: Yes
Consent: The patient's consent was obtained including but not limited to risks of crusting, scabbing, blistering, scarring, darker or lighter pigmentary change, recurrence, incomplete removal and infection.
Detail Level: Detailed
Aperture Size (Optional): C
Duration Of Freeze Thaw-Cycle (Seconds): 4
Application Tool (Optional): Cry-AC
Medical Necessity Clause: This procedure was medically necessary because the lesions that were treated were:
Duration Of Freeze Thaw-Cycle (Seconds): 3
Medical Necessity Information: It is in your best interest to select a reason for this procedure from the list below. All of these items fulfill various CMS LCD requirements except the new and changing color options.
Spray Paint Text: The liquid nitrogen was applied to the skin utilizing a spray paint frosting technique.
Number Of Freeze-Thaw Cycles: 2 freeze-thaw cycles
Consent: The patient's verbal consent was obtained including but not limited to risks of crusting, scabbing, blistering, scarring, darker or lighter pigmentary change, recurrence, incomplete removal and infection.

## 2024-12-09 NOTE — PROCEDURE: BIOPSY BY SHAVE METHOD
Detail Level: Detailed
Depth Of Biopsy: dermis
Was A Bandage Applied: Yes
Size Of Lesion In Cm: 0.5
X Size Of Lesion In Cm: 0
Biopsy Type: H and E
Biopsy Method: Dermablade
Anesthesia Type: 1% lidocaine with epinephrine
Hemostasis: Drysol
Wound Care: Petrolatum
Dressing: bandage
Destruction After The Procedure: No
Type Of Destruction Used: Curettage
Curettage Text: The wound bed was treated with curettage after the biopsy was performed.
Cryotherapy Text: The wound bed was treated with cryotherapy after the biopsy was performed.
Electrodesiccation Text: The wound bed was treated with electrodesiccation after the biopsy was performed.
Electrodesiccation And Curettage Text: The wound bed was treated with electrodesiccation and curettage after the biopsy was performed.
Silver Nitrate Text: The wound bed was treated with silver nitrate after the biopsy was performed.
Lab: [id number]
Lab Facility: [id number]
Medical Necessity Information: It is in your best interest to select a reason for this procedure from the list below. All of these items fulfill various CMS LCD requirements except the new and changing color options.
Consent: Verbal consent was obtained and risks were reviewed including but not limited to scarring, infection, bleeding, scabbing, incomplete removal, nerve damage and allergy to anesthesia.
Post-Care Instructions: I reviewed with the patient in detail post-care instructions. Patient is to keep the biopsy site dry overnight, and then apply bacitracin twice daily until healed. Patient may apply hydrogen peroxide soaks to remove any crusting.
Notification Instructions: Patient will be notified of biopsy results. However, patient instructed to call the office if not contacted within 2 weeks.
Billing Type: Third-Party Bill
Information: Selecting Yes will display possible errors in your note based on the variables you have selected. This validation is only offered as a suggestion for you. PLEASE NOTE THAT THE VALIDATION TEXT WILL BE REMOVED WHEN YOU FINALIZE YOUR NOTE. IF YOU WANT TO FAX A PRELIMINARY NOTE YOU WILL NEED TO TOGGLE THIS TO 'NO' IF YOU DO NOT WANT IT IN YOUR FAXED NOTE.